# Patient Record
Sex: MALE | Race: WHITE
[De-identification: names, ages, dates, MRNs, and addresses within clinical notes are randomized per-mention and may not be internally consistent; named-entity substitution may affect disease eponyms.]

---

## 2021-04-14 ENCOUNTER — HOSPITAL ENCOUNTER (OUTPATIENT)
Dept: HOSPITAL 95 - ORSCSDS | Age: 61
Discharge: HOME | End: 2021-04-14
Payer: COMMERCIAL

## 2021-04-14 VITALS — WEIGHT: 215.17 LBS | HEIGHT: 70.98 IN | BODY MASS INDEX: 30.12 KG/M2

## 2021-04-14 DIAGNOSIS — E78.00: ICD-10-CM

## 2021-04-14 DIAGNOSIS — I10: ICD-10-CM

## 2021-04-14 DIAGNOSIS — E66.9: ICD-10-CM

## 2021-04-14 DIAGNOSIS — G56.02: Primary | ICD-10-CM

## 2021-04-14 DIAGNOSIS — Z79.899: ICD-10-CM

## 2021-04-14 DIAGNOSIS — Z79.82: ICD-10-CM

## 2021-04-14 PROCEDURE — 01N54ZZ RELEASE MEDIAN NERVE, PERCUTANEOUS ENDOSCOPIC APPROACH: ICD-10-PCS

## 2021-10-06 ENCOUNTER — HOSPITAL ENCOUNTER (OUTPATIENT)
Dept: HOSPITAL 95 - LAB | Age: 61
Discharge: HOME | End: 2021-10-06
Payer: COMMERCIAL

## 2021-10-06 DIAGNOSIS — Z51.81: Primary | ICD-10-CM

## 2021-10-06 DIAGNOSIS — Z79.891: ICD-10-CM

## 2021-10-06 LAB — OXYCODONE UR-MCNC: DETECTED NG/ML

## 2022-08-08 ENCOUNTER — HOSPITAL ENCOUNTER (OUTPATIENT)
Dept: HOSPITAL 95 - ORSCSDS | Age: 62
Discharge: HOME | End: 2022-08-08
Attending: INTERNAL MEDICINE
Payer: COMMERCIAL

## 2022-08-08 VITALS — HEIGHT: 71 IN | BODY MASS INDEX: 28.7 KG/M2 | WEIGHT: 205.03 LBS

## 2022-08-08 DIAGNOSIS — Z85.46: ICD-10-CM

## 2022-08-08 DIAGNOSIS — K57.30: ICD-10-CM

## 2022-08-08 DIAGNOSIS — D12.2: ICD-10-CM

## 2022-08-08 DIAGNOSIS — Z12.11: Primary | ICD-10-CM

## 2022-08-08 DIAGNOSIS — Z79.899: ICD-10-CM

## 2022-08-08 PROCEDURE — 0DBM8ZX EXCISION OF DESCENDING COLON, VIA NATURAL OR ARTIFICIAL OPENING ENDOSCOPIC, DIAGNOSTIC: ICD-10-PCS | Performed by: INTERNAL MEDICINE

## 2022-08-08 PROCEDURE — 0DBN8ZX EXCISION OF SIGMOID COLON, VIA NATURAL OR ARTIFICIAL OPENING ENDOSCOPIC, DIAGNOSTIC: ICD-10-PCS | Performed by: INTERNAL MEDICINE

## 2022-08-08 PROCEDURE — 0DBK8ZX EXCISION OF ASCENDING COLON, VIA NATURAL OR ARTIFICIAL OPENING ENDOSCOPIC, DIAGNOSTIC: ICD-10-PCS | Performed by: INTERNAL MEDICINE

## 2022-08-09 ENCOUNTER — HOSPITAL ENCOUNTER (OUTPATIENT)
Dept: HOSPITAL 95 - PLD | Age: 62
End: 2022-08-09
Payer: COMMERCIAL

## 2022-08-09 DIAGNOSIS — R21: Primary | ICD-10-CM

## 2023-10-31 ENCOUNTER — HOSPITAL ENCOUNTER (OUTPATIENT)
Dept: HOSPITAL 95 - ORSCSDS | Age: 63
Discharge: HOME | End: 2023-10-31
Payer: MEDICARE

## 2023-10-31 VITALS — HEIGHT: 71 IN | BODY MASS INDEX: 29.97 KG/M2 | WEIGHT: 214.07 LBS

## 2023-10-31 VITALS — DIASTOLIC BLOOD PRESSURE: 82 MMHG | SYSTOLIC BLOOD PRESSURE: 155 MMHG

## 2023-10-31 DIAGNOSIS — Z85.46: ICD-10-CM

## 2023-10-31 DIAGNOSIS — G56.01: Primary | ICD-10-CM

## 2023-10-31 DIAGNOSIS — Z85.828: ICD-10-CM

## 2023-10-31 DIAGNOSIS — Z79.899: ICD-10-CM

## 2023-10-31 DIAGNOSIS — G56.21: ICD-10-CM

## 2023-10-31 DIAGNOSIS — Z87.891: ICD-10-CM

## 2023-10-31 DIAGNOSIS — I10: ICD-10-CM

## 2023-10-31 PROCEDURE — 01N54ZZ RELEASE MEDIAN NERVE, PERCUTANEOUS ENDOSCOPIC APPROACH: ICD-10-PCS

## 2023-10-31 PROCEDURE — 01S40ZZ REPOSITION ULNAR NERVE, OPEN APPROACH: ICD-10-PCS

## 2023-10-31 NOTE — NUR
10/31/23 1021 Wendie Serrano
[id number]
IV REMOVED CANNULA INTACT, PT MARK WELL DENIES PAIN AND NAUSEA
VERBALIZES READINESS TO GO HOME.